# Patient Record
Sex: FEMALE | Race: WHITE | NOT HISPANIC OR LATINO | Employment: FULL TIME | URBAN - METROPOLITAN AREA
[De-identification: names, ages, dates, MRNs, and addresses within clinical notes are randomized per-mention and may not be internally consistent; named-entity substitution may affect disease eponyms.]

---

## 2019-02-14 ENCOUNTER — OFFICE VISIT (OUTPATIENT)
Dept: URGENT CARE | Facility: CLINIC | Age: 24
End: 2019-02-14
Payer: COMMERCIAL

## 2019-02-14 VITALS
BODY MASS INDEX: 23.54 KG/M2 | HEIGHT: 67 IN | DIASTOLIC BLOOD PRESSURE: 86 MMHG | OXYGEN SATURATION: 98 % | RESPIRATION RATE: 16 BRPM | WEIGHT: 150 LBS | SYSTOLIC BLOOD PRESSURE: 121 MMHG | HEART RATE: 116 BPM | TEMPERATURE: 99 F

## 2019-02-14 DIAGNOSIS — J02.9 SORE THROAT: ICD-10-CM

## 2019-02-14 DIAGNOSIS — J06.9 VIRAL URI WITH COUGH: Primary | ICD-10-CM

## 2019-02-14 LAB
CTP QC/QA: YES
S PYO RRNA THROAT QL PROBE: NEGATIVE

## 2019-02-14 PROCEDURE — 87880 POCT RAPID STREP A: ICD-10-PCS | Mod: QW,S$GLB,, | Performed by: NURSE PRACTITIONER

## 2019-02-14 PROCEDURE — 3008F BODY MASS INDEX DOCD: CPT | Mod: CPTII,S$GLB,, | Performed by: NURSE PRACTITIONER

## 2019-02-14 PROCEDURE — 87880 STREP A ASSAY W/OPTIC: CPT | Mod: QW,S$GLB,, | Performed by: NURSE PRACTITIONER

## 2019-02-14 PROCEDURE — 99203 PR OFFICE/OUTPT VISIT, NEW, LEVL III, 30-44 MIN: ICD-10-PCS | Mod: S$GLB,,, | Performed by: NURSE PRACTITIONER

## 2019-02-14 PROCEDURE — 99203 OFFICE O/P NEW LOW 30 MIN: CPT | Mod: S$GLB,,, | Performed by: NURSE PRACTITIONER

## 2019-02-14 PROCEDURE — 3008F PR BODY MASS INDEX (BMI) DOCUMENTED: ICD-10-PCS | Mod: CPTII,S$GLB,, | Performed by: NURSE PRACTITIONER

## 2019-02-14 RX ORDER — FLUTICASONE PROPIONATE 50 MCG
1 SPRAY, SUSPENSION (ML) NASAL 2 TIMES DAILY
Qty: 1 BOTTLE | Refills: 0 | Status: SHIPPED | OUTPATIENT
Start: 2019-02-14 | End: 2019-02-28

## 2019-02-14 RX ORDER — BUSPIRONE HYDROCHLORIDE 15 MG/1
15 TABLET ORAL 3 TIMES DAILY PRN
COMMUNITY

## 2019-02-14 RX ORDER — PROMETHAZINE HYDROCHLORIDE AND DEXTROMETHORPHAN HYDROBROMIDE 6.25; 15 MG/5ML; MG/5ML
5 SYRUP ORAL NIGHTLY PRN
Qty: 118 ML | Refills: 0 | Status: SHIPPED | OUTPATIENT
Start: 2019-02-14 | End: 2019-02-24

## 2019-02-14 RX ORDER — BUPROPION HYDROCHLORIDE 300 MG/1
300 TABLET ORAL DAILY
COMMUNITY

## 2019-02-14 NOTE — PATIENT INSTRUCTIONS
You may continue taking Tylenol (acetaminophen) or ibuprofen for pain and fever.      Viral Upper Respiratory Illness (Adult)  You have a viral upper respiratory illness (URI), which is another term for the common cold. This illness is contagious during the first few days. It is spread through the air by coughing and sneezing. It may also be spread by direct contact (touching the sick person and then touching your own eyes, nose, or mouth). Frequent handwashing will decrease risk of spread. Most viral illnesses go away within 7 to 10 days with rest and simple home remedies. Sometimes the illness may last for several weeks. Antibiotics will not kill a virus, and they are generally not prescribed for this condition.    Home care  · If symptoms are severe, rest at home for the first 2 to 3 days. When you resume activity, don't let yourself get too tired.  · Avoid being exposed to cigarette smoke (yours or others).  · You may use acetaminophen or ibuprofen to control pain and fever, unless another medicine was prescribed. (Note: If you have chronic liver or kidney disease, have ever had a stomach ulcer or gastrointestinal bleeding, or are taking blood-thinning medicines, talk with your healthcare provider before using these medicines.) Aspirin should never be given to anyone under 18 years of age who is ill with a viral infection or fever. It may cause severe liver or brain damage.  · Your appetite may be poor, so a light diet is fine. Avoid dehydration by drinking 6 to 8 glasses of fluids per day (water, soft drinks, juices, tea, or soup). Extra fluids will help loosen secretions in the nose and lungs.  · Over-the-counter cold medicines will not shorten the length of time youre sick, but they may be helpful for the following symptoms: cough, sore throat, and nasal and sinus congestion. (Note: Do not use decongestants if you have high blood pressure.)  Follow-up care  Follow up with your healthcare provider, or as  advised.  When to seek medical advice  Call your healthcare provider right away if any of these occur:  · Cough with lots of colored sputum (mucus)  · Severe headache; face, neck, or ear pain  · Difficulty swallowing due to throat pain  · Fever of 100.4°F (38°C)  Call 911, or get immediate medical care  Call emergency services right away if any of these occur:  · Chest pain, shortness of breath, wheezing, or difficulty breathing  · Coughing up blood  · Inability to swallow due to throat pain  Date Last Reviewed: 9/13/2015  © 4367-5198 [employer]. [address] [place] [address]. All rights reserved. This information is not intended as a substitute for professional medical care. Always follow your healthcare professional's instructions.

## 2019-02-14 NOTE — PROGRESS NOTES
"Subjective:       Patient ID: Carmella Gallagher is a 23 y.o. female.    Vitals:  height is 5' 7" (1.702 m) and weight is 68 kg (150 lb). Her temperature is 98.6 °F (37 °C). Her blood pressure is 121/86 and her pulse is 116 (abnormal). Her respiration is 16 and oxygen saturation is 98%.     Chief Complaint: Sore Throat    Pt lives local. Pt is in for sore throat, runny nose, post nasal drip, cough, congestion, and headache-started on Monday (3 days). Feels sore throat has worsened since onset. Worse at night. Some cough that is worse at night. Pt stated it is painful to swallow. Pt denies fever. Pt took nyquil-with no relief.     No fever. No known sick contacts. No N/V/D.      Sore Throat    This is a new problem. The current episode started in the past 7 days. The problem has been gradually worsening. Neither side of throat is experiencing more pain than the other. The pain is at a severity of 5/10. Associated symptoms include congestion, coughing, headaches and trouble swallowing. Pertinent negatives include no abdominal pain, diarrhea, drooling, ear discharge, ear pain, hoarse voice, plugged ear sensation, neck pain, shortness of breath, stridor, swollen glands or vomiting. Treatments tried: nyquil. The treatment provided no relief.       Constitution: Negative for chills, sweating, fatigue and fever.   HENT: Positive for congestion, postnasal drip, sore throat and trouble swallowing. Negative for ear pain, ear discharge, drooling, sinus pain, sinus pressure and voice change.    Neck: Negative for neck pain and painful lymph nodes.   Eyes: Negative for eye redness.   Respiratory: Positive for cough. Negative for chest tightness, sputum production, bloody sputum, COPD, shortness of breath, stridor, wheezing and asthma.    Gastrointestinal: Negative for abdominal pain, nausea, vomiting and diarrhea.   Musculoskeletal: Negative for muscle ache.   Skin: Negative for rash.   Allergic/Immunologic: Negative for seasonal " allergies and asthma.   Neurological: Positive for headaches.   Hematologic/Lymphatic: Negative for swollen lymph nodes.       Objective:      Physical Exam   Constitutional: She is oriented to person, place, and time. She appears well-developed and well-nourished. She is cooperative.  Non-toxic appearance. She does not appear ill. No distress.   HENT:   Head: Normocephalic and atraumatic.   Right Ear: Hearing, tympanic membrane, external ear and ear canal normal.   Left Ear: Hearing, tympanic membrane, external ear and ear canal normal.   Nose: Rhinorrhea present. No mucosal edema or nasal deformity. No epistaxis. Right sinus exhibits no maxillary sinus tenderness and no frontal sinus tenderness. Left sinus exhibits no maxillary sinus tenderness and no frontal sinus tenderness.   Mouth/Throat: Uvula is midline and mucous membranes are normal. No trismus in the jaw. Normal dentition. No uvula swelling. Posterior oropharyngeal erythema present.       Eyes: Conjunctivae and lids are normal. No scleral icterus.   Sclera clear bilat   Neck: Trachea normal, full passive range of motion without pain and phonation normal. Neck supple.   Cardiovascular: Normal rate, regular rhythm, normal heart sounds, intact distal pulses and normal pulses.   Pulmonary/Chest: Effort normal and breath sounds normal. No stridor. No respiratory distress. She has no decreased breath sounds. She has no wheezes.   Abdominal: Soft. Normal appearance and bowel sounds are normal. She exhibits no distension. There is no tenderness.   Musculoskeletal: Normal range of motion. She exhibits no edema or deformity.   Neurological: She is alert and oriented to person, place, and time. She exhibits normal muscle tone. Coordination normal.   Skin: Skin is warm, dry and intact. She is not diaphoretic. No pallor.   Psychiatric: She has a normal mood and affect. Her speech is normal and behavior is normal. Judgment and thought content normal. Cognition and  memory are normal.   Nursing note and vitals reviewed.      POCT rapid strep A   Order: [id number]   Status:  Final result   Visible to patient:  No (Not Released) Next appt:  None Dx:  Sore throat    Ref Range & Units 10:43   Rapid Strep A Screen Negative Negative     Acceptable  Yes    [place]  [facility] [place]essment:       1. Viral URI with cough    2. Sore throat        Plan:         Viral URI with cough  -     fluticasone (FLONASE) 50 mcg/actuation nasal spray; 1 spray (50 mcg total) by Each Nare route 2 (two) times daily. for 14 days  Dispense: 1 Bottle; Refill: 0  -     promethazine-dextromethorphan (PROMETHAZINE-DM) 6.25-15 mg/5 mL Syrp; Take 5 mLs by mouth nightly as needed.  Dispense: 118 mL; Refill: 0    Sore throat  -     POCT rapid strep A  -     (Magic mouthwash) 1:1:1 Benadryl 12.5mg/5ml liq, aluminum & magnesium hydroxide-simehticone (Maalox), lidocaine viscous 2%; Swish and spit 5 mLs every 4 (four) hours as needed (gargle and spit). for mouth sores  Dispense: 360 mL; Refill: 0      Patient Instructions   You may continue taking Tylenol (acetaminophen) or ibuprofen for pain and fever.      Viral Upper Respiratory Illness (Adult)  You have a viral upper respiratory illness (URI), which is another term for the common cold. This illness is contagious during the first few days. It is spread through the air by coughing and sneezing. It may also be spread by direct contact (touching the sick person and then touching your own eyes, nose, or mouth). Frequent handwashing will decrease risk of spread. Most viral illnesses go away within 7 to 10 days with rest and simple home remedies. Sometimes the illness may last for several weeks. Antibiotics will not kill a virus, and they are generally not prescribed for this condition.    Home care  · If symptoms are severe, rest at home for the first 2 to 3 days. When you resume activity, don't let yourself get too tired.  · Avoid being  exposed to cigarette smoke (yours or others).  · You may use acetaminophen or ibuprofen to control pain and fever, unless another medicine was prescribed. (Note: If you have chronic liver or kidney disease, have ever had a stomach ulcer or gastrointestinal bleeding, or are taking blood-thinning medicines, talk with your healthcare provider before using these medicines.) Aspirin should never be given to anyone under 18 years of age who is ill with a viral infection or fever. It may cause severe liver or brain damage.  · Your appetite may be poor, so a light diet is fine. Avoid dehydration by drinking 6 to 8 glasses of fluids per day (water, soft drinks, juices, tea, or soup). Extra fluids will help loosen secretions in the nose and lungs.  · Over-the-counter cold medicines will not shorten the length of time youre sick, but they may be helpful for the following symptoms: cough, sore throat, and nasal and sinus congestion. (Note: Do not use decongestants if you have high blood pressure.)  Follow-up care  Follow up with your healthcare provider, or as advised.  When to seek medical advice  Call your healthcare provider right away if any of these occur:  · Cough with lots of colored sputum (mucus)  · Severe headache; face, neck, or ear pain  · Difficulty swallowing due to throat pain  · Fever of 100.4°F (38°C)  Call 911, or get immediate medical care  Call emergency services right away if any of these occur:  · Chest pain, shortness of breath, wheezing, or difficulty breathing  · Coughing up blood  · Inability to swallow due to throat pain  Date Last Reviewed: 9/13/2015 © 2000-2017 [employer]. [address] [place] [address], [place] [address]. All rights reserved. This information is not intended as a substitute for professional medical care. Always follow your healthcare professional's instructions.

## 2019-02-17 ENCOUNTER — TELEPHONE (OUTPATIENT)
Dept: URGENT CARE | Facility: CLINIC | Age: 24
End: 2019-02-17

## 2021-03-13 ENCOUNTER — IMMUNIZATION (OUTPATIENT)
Dept: PRIMARY CARE CLINIC | Facility: CLINIC | Age: 26
End: 2021-03-13
Payer: COMMERCIAL

## 2021-03-13 DIAGNOSIS — Z23 NEED FOR VACCINATION: Primary | ICD-10-CM

## 2021-03-13 PROCEDURE — 0001A PR IMMUNIZ ADMIN, SARS-COV-2 COVID-19 VACC, 30MCG/0.3ML, 1ST DOSE: CPT | Mod: CV19,S$GLB,, | Performed by: INTERNAL MEDICINE

## 2021-03-13 PROCEDURE — 91300 PR SARS-COV- 2 COVID-19 VACCINE, NO PRSV, 30MCG/0.3ML, IM: CPT | Mod: S$GLB,,, | Performed by: INTERNAL MEDICINE

## 2021-03-13 PROCEDURE — 91300 PR SARS-COV- 2 COVID-19 VACCINE, NO PRSV, 30MCG/0.3ML, IM: ICD-10-PCS | Mod: S$GLB,,, | Performed by: INTERNAL MEDICINE

## 2021-03-13 PROCEDURE — 0001A PR IMMUNIZ ADMIN, SARS-COV-2 COVID-19 VACC, 30MCG/0.3ML, 1ST DOSE: ICD-10-PCS | Mod: CV19,S$GLB,, | Performed by: INTERNAL MEDICINE

## 2021-03-13 RX ADMIN — Medication 0.3 ML: at 11:03

## 2021-04-03 ENCOUNTER — IMMUNIZATION (OUTPATIENT)
Dept: PRIMARY CARE CLINIC | Facility: CLINIC | Age: 26
End: 2021-04-03
Payer: COMMERCIAL

## 2021-04-03 DIAGNOSIS — Z23 NEED FOR VACCINATION: Primary | ICD-10-CM

## 2021-04-03 PROCEDURE — 91300 PR SARS-COV- 2 COVID-19 VACCINE, NO PRSV, 30MCG/0.3ML, IM: CPT | Mod: S$GLB,,, | Performed by: INTERNAL MEDICINE

## 2021-04-03 PROCEDURE — 0002A PR IMMUNIZ ADMIN, SARS-COV-2 COVID-19 VACC, 30MCG/0.3ML, 2ND DOSE: ICD-10-PCS | Mod: CV19,S$GLB,, | Performed by: INTERNAL MEDICINE

## 2021-04-03 PROCEDURE — 91300 PR SARS-COV- 2 COVID-19 VACCINE, NO PRSV, 30MCG/0.3ML, IM: ICD-10-PCS | Mod: S$GLB,,, | Performed by: INTERNAL MEDICINE

## 2021-04-03 PROCEDURE — 0002A PR IMMUNIZ ADMIN, SARS-COV-2 COVID-19 VACC, 30MCG/0.3ML, 2ND DOSE: CPT | Mod: CV19,S$GLB,, | Performed by: INTERNAL MEDICINE

## 2021-04-03 RX ADMIN — Medication 0.3 ML: at 11:04

## 2022-11-11 ENCOUNTER — HOSPITAL ENCOUNTER (EMERGENCY)
Facility: OTHER | Age: 27
Discharge: HOME | End: 2022-11-11
Attending: EMERGENCY MEDICINE
Payer: MEDICAID

## 2022-11-11 VITALS
WEIGHT: 160 LBS | OXYGEN SATURATION: 99 % | SYSTOLIC BLOOD PRESSURE: 121 MMHG | RESPIRATION RATE: 18 BRPM | DIASTOLIC BLOOD PRESSURE: 82 MMHG | HEIGHT: 67 IN | HEART RATE: 92 BPM | BODY MASS INDEX: 25.11 KG/M2 | TEMPERATURE: 98 F

## 2022-11-11 DIAGNOSIS — R56.9 SEIZURE: ICD-10-CM

## 2022-11-11 LAB
ALBUMIN SERPL BCP-MCNC: 3.9 G/DL (ref 3.5–5.2)
ALP SERPL-CCNC: 135 U/L (ref 55–135)
ALT SERPL W/O P-5'-P-CCNC: 28 U/L (ref 10–44)
AMPHET+METHAMPHET UR QL: NEGATIVE
ANION GAP SERPL CALC-SCNC: 8 MMOL/L (ref 8–16)
AST SERPL-CCNC: 22 U/L (ref 10–40)
B-HCG UR QL: NEGATIVE
BACTERIA #/AREA URNS HPF: NORMAL /HPF
BARBITURATES UR QL SCN>200 NG/ML: NEGATIVE
BASOPHILS # BLD AUTO: 0.06 K/UL (ref 0–0.2)
BASOPHILS NFR BLD: 0.6 % (ref 0–1.9)
BENZODIAZ UR QL SCN>200 NG/ML: NEGATIVE
BILIRUB SERPL-MCNC: 0.4 MG/DL (ref 0.1–1)
BILIRUB UR QL STRIP: NEGATIVE
BUN SERPL-MCNC: 13 MG/DL (ref 6–20)
BZE UR QL SCN: NEGATIVE
CALCIUM SERPL-MCNC: 8.8 MG/DL (ref 8.7–10.5)
CANNABINOIDS UR QL SCN: NEGATIVE
CHLORIDE SERPL-SCNC: 109 MMOL/L (ref 95–110)
CLARITY UR: CLEAR
CO2 SERPL-SCNC: 21 MMOL/L (ref 23–29)
COLOR UR: YELLOW
CREAT SERPL-MCNC: 1 MG/DL (ref 0.5–1.4)
CREAT UR-MCNC: 127.3 MG/DL (ref 15–325)
CTP QC/QA: YES
DIFFERENTIAL METHOD: ABNORMAL
EOSINOPHIL # BLD AUTO: 0.3 K/UL (ref 0–0.5)
EOSINOPHIL NFR BLD: 3 % (ref 0–8)
ERYTHROCYTE [DISTWIDTH] IN BLOOD BY AUTOMATED COUNT: 11.3 % (ref 11.5–14.5)
EST. GFR  (NO RACE VARIABLE): >60 ML/MIN/1.73 M^2
GLUCOSE SERPL-MCNC: 90 MG/DL (ref 70–110)
GLUCOSE UR QL STRIP: NEGATIVE
HCT VFR BLD AUTO: 39.3 % (ref 37–48.5)
HCV AB SERPL QL IA: NEGATIVE
HGB BLD-MCNC: 13.8 G/DL (ref 12–16)
HGB UR QL STRIP: NEGATIVE
HIV 1+2 AB+HIV1 P24 AG SERPL QL IA: NEGATIVE
IMM GRANULOCYTES # BLD AUTO: 0.05 K/UL (ref 0–0.04)
IMM GRANULOCYTES NFR BLD AUTO: 0.5 % (ref 0–0.5)
KETONES UR QL STRIP: ABNORMAL
LEUKOCYTE ESTERASE UR QL STRIP: ABNORMAL
LYMPHOCYTES # BLD AUTO: 1.7 K/UL (ref 1–4.8)
LYMPHOCYTES NFR BLD: 16.6 % (ref 18–48)
MAGNESIUM SERPL-MCNC: 2.2 MG/DL (ref 1.6–2.6)
MCH RBC QN AUTO: 33.2 PG (ref 27–31)
MCHC RBC AUTO-ENTMCNC: 35.1 G/DL (ref 32–36)
MCV RBC AUTO: 95 FL (ref 82–98)
METHADONE UR QL SCN>300 NG/ML: NEGATIVE
MICROSCOPIC COMMENT: NORMAL
MONOCYTES # BLD AUTO: 0.6 K/UL (ref 0.3–1)
MONOCYTES NFR BLD: 5.9 % (ref 4–15)
NEUTROPHILS # BLD AUTO: 7.4 K/UL (ref 1.8–7.7)
NEUTROPHILS NFR BLD: 73.4 % (ref 38–73)
NITRITE UR QL STRIP: NEGATIVE
NRBC BLD-RTO: 0 /100 WBC
OPIATES UR QL SCN: NEGATIVE
PCP UR QL SCN>25 NG/ML: NEGATIVE
PH UR STRIP: 6 [PH] (ref 5–8)
PLATELET # BLD AUTO: 282 K/UL (ref 150–450)
PMV BLD AUTO: 10.4 FL (ref 9.2–12.9)
POTASSIUM SERPL-SCNC: 3.9 MMOL/L (ref 3.5–5.1)
PROT SERPL-MCNC: 6.9 G/DL (ref 6–8.4)
PROT UR QL STRIP: ABNORMAL
RBC # BLD AUTO: 4.16 M/UL (ref 4–5.4)
SODIUM SERPL-SCNC: 138 MMOL/L (ref 136–145)
SP GR UR STRIP: >=1.03 (ref 1–1.03)
SQUAMOUS #/AREA URNS HPF: 4 /HPF
TOXICOLOGY INFORMATION: NORMAL
URN SPEC COLLECT METH UR: ABNORMAL
UROBILINOGEN UR STRIP-ACNC: NEGATIVE EU/DL
WBC # BLD AUTO: 10.03 K/UL (ref 3.9–12.7)
WBC #/AREA URNS HPF: 2 /HPF (ref 0–5)

## 2022-11-11 PROCEDURE — 25000003 PHARM REV CODE 250: Performed by: EMERGENCY MEDICINE

## 2022-11-11 PROCEDURE — 93010 EKG 12-LEAD: ICD-10-PCS | Mod: ,,, | Performed by: INTERNAL MEDICINE

## 2022-11-11 PROCEDURE — 85025 COMPLETE CBC W/AUTO DIFF WBC: CPT | Performed by: EMERGENCY MEDICINE

## 2022-11-11 PROCEDURE — 80053 COMPREHEN METABOLIC PANEL: CPT | Performed by: EMERGENCY MEDICINE

## 2022-11-11 PROCEDURE — 81025 URINE PREGNANCY TEST: CPT | Performed by: EMERGENCY MEDICINE

## 2022-11-11 PROCEDURE — 87389 HIV-1 AG W/HIV-1&-2 AB AG IA: CPT | Performed by: EMERGENCY MEDICINE

## 2022-11-11 PROCEDURE — 81000 URINALYSIS NONAUTO W/SCOPE: CPT | Mod: 59 | Performed by: EMERGENCY MEDICINE

## 2022-11-11 PROCEDURE — 93010 ELECTROCARDIOGRAM REPORT: CPT | Mod: ,,, | Performed by: INTERNAL MEDICINE

## 2022-11-11 PROCEDURE — 80307 DRUG TEST PRSMV CHEM ANLYZR: CPT | Performed by: EMERGENCY MEDICINE

## 2022-11-11 PROCEDURE — 83735 ASSAY OF MAGNESIUM: CPT | Performed by: EMERGENCY MEDICINE

## 2022-11-11 PROCEDURE — 99285 EMERGENCY DEPT VISIT HI MDM: CPT | Mod: 25

## 2022-11-11 PROCEDURE — 93005 ELECTROCARDIOGRAM TRACING: CPT

## 2022-11-11 PROCEDURE — 86803 HEPATITIS C AB TEST: CPT | Performed by: EMERGENCY MEDICINE

## 2022-11-11 PROCEDURE — 96374 THER/PROPH/DIAG INJ IV PUSH: CPT

## 2022-11-11 PROCEDURE — 63600175 PHARM REV CODE 636 W HCPCS: Performed by: EMERGENCY MEDICINE

## 2022-11-11 PROCEDURE — 96361 HYDRATE IV INFUSION ADD-ON: CPT

## 2022-11-11 RX ORDER — LEVETIRACETAM 500 MG/1
500 TABLET ORAL 2 TIMES DAILY
Qty: 62 TABLET | Refills: 0 | Status: SHIPPED | OUTPATIENT
Start: 2022-11-11 | End: 2022-11-16

## 2022-11-11 RX ORDER — SODIUM CHLORIDE 9 MG/ML
1000 INJECTION, SOLUTION INTRAVENOUS
Status: COMPLETED | OUTPATIENT
Start: 2022-11-11 | End: 2022-11-11

## 2022-11-11 RX ORDER — LEVETIRACETAM 500 MG/5ML
1000 INJECTION, SOLUTION, CONCENTRATE INTRAVENOUS
Status: COMPLETED | OUTPATIENT
Start: 2022-11-11 | End: 2022-11-11

## 2022-11-11 RX ADMIN — SODIUM CHLORIDE 1000 ML: 0.9 INJECTION, SOLUTION INTRAVENOUS at 02:11

## 2022-11-11 RX ADMIN — LEVETIRACETAM 1000 MG: 500 INJECTION, SOLUTION INTRAVENOUS at 05:11

## 2022-11-11 NOTE — ED PROVIDER NOTES
"Encounter Date: 11/11/2022       History     Chief Complaint   Patient presents with    Seizures     Pt presents to the ER with concerns she may have had a seizure. Pt states her co-workers reporting she was unresponsive, staring into space, turned blue and was foaming at the mouth. Episode lasted "not that long", but pt wa "out of it" after episode. Pt reports hx of seizures as a child; has not had a seizure since she was 2yo; not currently on seizure medications.        Pt is a 27yoF h/o depression, anxiety presenting for evaluation after witnessed seizure at work. Pt states she has been in her usual state of health recently, no fever chills, headache, head injuries, neck pain, cough, SOB, chest pain, exercise intolerance, vomiting or diarrhea, leg pain or swelling. She has been under a lot of stress at work. Today she was at work sitting in a chair at her desk when (per coworkers report) she stiffened, looked to the side, appeared to be staring but didn't respond when they talked to her, then she developed generalized shaking, "foaming at her mouth" and noisy irregular breathing. Event lasted minutes and then resolved. Pt did not fall from chair, did not hit her head. She was confused afterwards but is now back to baseline. Pt states she does not recall any of this.     She reports having one seizure as a child at age 3, unsure if it was febrile seizure or not, did not take seizure medications. Has not had any seizures in adulthood to her knowledge. She does take Wellbutrin 450mg/day, has been stable on this for approx 7 years, no recent dose adjustments or extra doses. She reports drinking 1-2 glass of wine/day for the past several years, but denies any withdrawal sx. She in fact abstained completely or several days last week d/t taking abx for BV and did not have any withdrawal symptoms at that time. Denies shakes, sweats, nausea/vomiting, hallucinations, tremors, confusion, etc.     Review of patient's " allergies indicates:  No Known Allergies  Past Medical History:   Diagnosis Date    Anxiety     Bipolar disorder     PTSD (post-traumatic stress disorder)      No past surgical history on file.  Family History   Problem Relation Age of Onset    No Known Problems Mother     No Known Problems Father      Social History     Tobacco Use    Smoking status: Never    Smokeless tobacco: Never   Substance Use Topics    Alcohol use: Yes     Comment: occ    Drug use: No     Review of Systems   Constitutional:  Negative for chills and fever.   Respiratory:  Negative for cough and shortness of breath.    Cardiovascular:  Negative for chest pain.   Gastrointestinal:  Negative for abdominal pain, diarrhea, nausea and vomiting.   Musculoskeletal:  Negative for neck pain.   Neurological:  Positive for seizures. Negative for dizziness, tremors, facial asymmetry, speech difficulty, weakness, light-headedness, numbness and headaches.   Psychiatric/Behavioral:  Negative for confusion and hallucinations.    All other systems reviewed and are negative.    Physical Exam     Initial Vitals [11/11/22 1308]   BP Pulse Resp Temp SpO2   123/76 (!) 133 18 98.9 °F (37.2 °C) 97 %      MAP       --         Physical Exam    Constitutional: She appears well-developed and well-nourished. She is not diaphoretic. No distress.   HENT:   Head: Normocephalic and atraumatic.   Mouth/Throat: No lacerations.   Eyes: EOM are normal. Pupils are equal, round, and reactive to light.   Neck:   Normal range of motion.  Cardiovascular:            Tachycardic, regular   Pulmonary/Chest: Breath sounds normal.   Abdominal: Abdomen is soft.   Musculoskeletal:         General: Normal range of motion.      Cervical back: Normal range of motion.     Neurological: She is alert and oriented to person, place, and time. She has normal strength. No cranial nerve deficit or sensory deficit. GCS score is 15. GCS eye subscore is 4. GCS verbal subscore is 5. GCS motor subscore is  6.   Skin: Skin is warm and dry.   Psychiatric: She has a normal mood and affect.       ED Course   Procedures  Labs Reviewed   CBC W/ AUTO DIFFERENTIAL - Abnormal; Notable for the following components:       Result Value    MCH 33.2 (*)     RDW 11.3 (*)     Immature Grans (Abs) 0.05 (*)     Gran % 73.4 (*)     Lymph % 16.6 (*)     All other components within normal limits   COMPREHENSIVE METABOLIC PANEL - Abnormal; Notable for the following components:    CO2 21 (*)     All other components within normal limits   URINALYSIS, REFLEX TO URINE CULTURE - Abnormal; Notable for the following components:    Specific Gravity, UA >=1.030 (*)     Protein, UA Trace (*)     Ketones, UA Trace (*)     Leukocytes, UA Trace (*)     All other components within normal limits    Narrative:     Specimen Source->Urine   MAGNESIUM   DRUG SCREEN PANEL, URINE EMERGENCY    Narrative:     Specimen Source->Urine   HIV 1 / 2 ANTIBODY    Narrative:     Release to patient->Immediate   HEPATITIS C ANTIBODY    Narrative:     Release to patient->Immediate   URINALYSIS MICROSCOPIC    Narrative:     Specimen Source->Urine   POCT URINE PREGNANCY     EKG Readings: (Independently Interpreted)   NSR rate 109  Normal axis and intervals  No signs of WPW, Brugada, or long QT syndrome  No acute ST changes  Normal EKG     Imaging Results              CT Head Without Contrast (Final result)  Result time 11/11/22 16:04:38      Final result by Diogenes Adams MD (11/11/22 16:04:38)                   Impression:      No intracranial mass, hemorrhage, or evidence of acute infarction.    Prominent CSF space in the posterior fossa, which could represent an arachnoid cyst or ariadna cisterna magna.      Electronically signed by: Diogenes Adams  Date:    11/11/2022  Time:    16:04               Narrative:    EXAMINATION:  CT HEAD WITHOUT CONTRAST    CLINICAL HISTORY:  Seizure, new-onset, no history of trauma;    TECHNIQUE:  Low dose axial images were obtained through the  head.  Coronal and sagittal reformations were also performed. Contrast was not administered.    COMPARISON:  None.    FINDINGS:  BRAIN: The brain parenchyma demonstrates no significant abnormality. No intracranial mass or hemorrhage. No evidence of acute infarction.    CSF SPACES: Prominent CSF space in the posterior fossa measuring 3.8 x 3.5 x 1.5 cm (2:12 and 602:60).  Ventricles, sulci, and cisterns are otherwise normal in size and configuration.    VESSELS: The major intracranial vessels are unremarkable.    BONES: No fracture or focal osseous lesion. Partially imaged mucous retention cyst in the left maxillary sinus.  Paranasal sinuses and mastoid air cells are otherwise well aerated.    SOFT TISSUES: Extracranial soft tissues are unremarkable.                                       Medications   0.9%  NaCl infusion (0 mLs Intravenous Stopped 11/11/22 1500)   levETIRAcetam injection 1,000 mg (1,000 mg Intravenous Given 11/11/22 1722)     Medical Decision Making:   ED Management:  27F presents after witnessed generalized tonic-clonic seizure at work. +Postictal but now back to baseline. No recent injuries or trauma.    H+P not c/w EtOH withdrawal sz. Likely new onset seizure in an adult. Low suspicion for syncope.     Plan:  -IVNS bolus  -CBC, CMP, Mg, UA/Upreg, EKG  -CT head noncon  -Discussed that Wellbutrin is likely not the right choice for patient at this point as it lowers seizure threshold. I would prefer to discuss this w her psychiatrist before abruptly discontinuing it. Left  for psychiatrist Dr. Kiera Olea [phone], awaiting callback    5:51pm  Labs and CT head unremarkable. Still no callback from Dr. Olea. I did speak with neurology on call Dr. Cai. He recommends starting patient on antiepileptics for now. Recommends she follow-up with psychiatrist asap to discuss stopping Wellbutrin, but he does not feel it is critical for patient to immediately discontinue this tonight. Will load w  Keppra and DC home with same. Long discussion with patient and her partner at bedside. Also discussed alcohol cessation. Pt understands and agrees w plan.                         Clinical Impression:   Final diagnoses:  [R56.9] Seizure        ED Disposition Condition    Discharge Stable          ED Prescriptions       Medication Sig Dispense Start Date End Date Auth. Provider    levETIRAcetam (KEPPRA) 500 MG Tab Take 1 tablet (500 mg total) by mouth 2 (two) times daily. 62 tablet 11/11/2022 12/12/2022 Jane Adams MD          Follow-up Information    None          Jane Adams MD  11/11/22 3595

## 2022-11-11 NOTE — Clinical Note
"Carmella"MONTY Gallagher was seen and treated in our emergency department on 11/11/2022.  She may return to work on 11/14/2022.       If you have any questions or concerns, please don't hesitate to call.      Jane Adams MD"

## 2022-11-11 NOTE — DISCHARGE INSTRUCTIONS
You have been seen in the ER today after having a seizure. Your CT scan and bloodwork did not show any significant abnormalities. I spoke with the neurologist on call, and we recommend:    Start taking the Keppra twice a day as prescribed. You received a loading dose in the ER - your next dose will be tomorrow morning.   Call to schedule the next available appointment at neurology clinic. I have placed a referral request, but you can also call them directly at [phone].  Do not drink alcohol as this lowers the seizure threshold  Speak with your psychiatrist as soon as possible about your Wellbutrin, and whether there are safer alternatives for you  Do not drive, do not take baths or swim, do not climb ladders or operate heavy machinery until cleared by neurology    Return to the ER for any new or worsening symptoms

## 2022-11-11 NOTE — ED NOTES
Pt to er 4 via w.c with c.o seizure while at work approx 1200.  Co worker witnessed pt starring off in space and jerking motion. Pt states hx of seizure at 3 years of age.  Pt has not ever been on siezure meds.  Pt states she felt fine this AM. States she has anxiety issue and felt little anxious at work. Pt states she is having trouble remembering things at this time. Pt states she feels like her brain is foggy and dizzy.  AAO x 3 nadn skin w.d  pt placed in gown and on cardiac monitors.  Family at bedside

## 2022-11-16 ENCOUNTER — OFFICE VISIT (OUTPATIENT)
Dept: NEUROLOGY | Facility: CLINIC | Age: 27
End: 2022-11-16
Payer: MEDICAID

## 2022-11-16 ENCOUNTER — LAB VISIT (OUTPATIENT)
Dept: LAB | Facility: HOSPITAL | Age: 27
End: 2022-11-16
Attending: PSYCHIATRY & NEUROLOGY
Payer: MEDICAID

## 2022-11-16 VITALS
SYSTOLIC BLOOD PRESSURE: 117 MMHG | DIASTOLIC BLOOD PRESSURE: 83 MMHG | HEART RATE: 101 BPM | BODY MASS INDEX: 25.58 KG/M2 | HEIGHT: 67 IN | WEIGHT: 163 LBS

## 2022-11-16 DIAGNOSIS — F31.9 BIPOLAR AFFECTIVE DISORDER, REMISSION STATUS UNSPECIFIED: ICD-10-CM

## 2022-11-16 DIAGNOSIS — R56.9 CONVULSIONS, UNSPECIFIED CONVULSION TYPE: Primary | ICD-10-CM

## 2022-11-16 DIAGNOSIS — F41.9 ANXIETY: ICD-10-CM

## 2022-11-16 DIAGNOSIS — F43.10 PTSD (POST-TRAUMATIC STRESS DISORDER): ICD-10-CM

## 2022-11-16 DIAGNOSIS — R56.9 CONVULSIONS, UNSPECIFIED CONVULSION TYPE: ICD-10-CM

## 2022-11-16 PROCEDURE — 3079F PR MOST RECENT DIASTOLIC BLOOD PRESSURE 80-89 MM HG: ICD-10-PCS | Mod: CPTII,,, | Performed by: PSYCHIATRY & NEUROLOGY

## 2022-11-16 PROCEDURE — 1159F PR MEDICATION LIST DOCUMENTED IN MEDICAL RECORD: ICD-10-PCS | Mod: CPTII,,, | Performed by: PSYCHIATRY & NEUROLOGY

## 2022-11-16 PROCEDURE — 3079F DIAST BP 80-89 MM HG: CPT | Mod: CPTII,,, | Performed by: PSYCHIATRY & NEUROLOGY

## 2022-11-16 PROCEDURE — 99205 PR OFFICE/OUTPT VISIT, NEW, LEVL V, 60-74 MIN: ICD-10-PCS | Mod: S$PBB,,, | Performed by: PSYCHIATRY & NEUROLOGY

## 2022-11-16 PROCEDURE — 1160F RVW MEDS BY RX/DR IN RCRD: CPT | Mod: CPTII,,, | Performed by: PSYCHIATRY & NEUROLOGY

## 2022-11-16 PROCEDURE — 80175 DRUG SCREEN QUAN LAMOTRIGINE: CPT | Performed by: PSYCHIATRY & NEUROLOGY

## 2022-11-16 PROCEDURE — 1160F PR REVIEW ALL MEDS BY PRESCRIBER/CLIN PHARMACIST DOCUMENTED: ICD-10-PCS | Mod: CPTII,,, | Performed by: PSYCHIATRY & NEUROLOGY

## 2022-11-16 PROCEDURE — 99214 OFFICE O/P EST MOD 30 MIN: CPT | Mod: PBBFAC | Performed by: PSYCHIATRY & NEUROLOGY

## 2022-11-16 PROCEDURE — 1159F MED LIST DOCD IN RCRD: CPT | Mod: CPTII,,, | Performed by: PSYCHIATRY & NEUROLOGY

## 2022-11-16 PROCEDURE — 99205 OFFICE O/P NEW HI 60 MIN: CPT | Mod: S$PBB,,, | Performed by: PSYCHIATRY & NEUROLOGY

## 2022-11-16 PROCEDURE — 3008F PR BODY MASS INDEX (BMI) DOCUMENTED: ICD-10-PCS | Mod: CPTII,,, | Performed by: PSYCHIATRY & NEUROLOGY

## 2022-11-16 PROCEDURE — 3074F SYST BP LT 130 MM HG: CPT | Mod: CPTII,,, | Performed by: PSYCHIATRY & NEUROLOGY

## 2022-11-16 PROCEDURE — 99999 PR PBB SHADOW E&M-EST. PATIENT-LVL IV: CPT | Mod: PBBFAC,,, | Performed by: PSYCHIATRY & NEUROLOGY

## 2022-11-16 PROCEDURE — 3008F BODY MASS INDEX DOCD: CPT | Mod: CPTII,,, | Performed by: PSYCHIATRY & NEUROLOGY

## 2022-11-16 PROCEDURE — 3074F PR MOST RECENT SYSTOLIC BLOOD PRESSURE < 130 MM HG: ICD-10-PCS | Mod: CPTII,,, | Performed by: PSYCHIATRY & NEUROLOGY

## 2022-11-16 PROCEDURE — 36415 COLL VENOUS BLD VENIPUNCTURE: CPT | Performed by: PSYCHIATRY & NEUROLOGY

## 2022-11-16 PROCEDURE — 99999 PR PBB SHADOW E&M-EST. PATIENT-LVL IV: ICD-10-PCS | Mod: PBBFAC,,, | Performed by: PSYCHIATRY & NEUROLOGY

## 2022-11-16 RX ORDER — CLONAZEPAM 0.5 MG/1
TABLET ORAL
COMMUNITY
Start: 2022-08-26

## 2022-11-16 RX ORDER — LAMOTRIGINE 200 MG/1
200 TABLET ORAL 2 TIMES DAILY
COMMUNITY
Start: 2022-10-31

## 2022-11-16 RX ORDER — ETONOGESTREL AND ETHINYL ESTRADIOL VAGINAL RING .015; .12 MG/D; MG/D
RING VAGINAL
COMMUNITY
Start: 2022-10-17

## 2022-11-16 RX ORDER — ASENAPINE MALEATE 2.5 MG/1
2.5 TABLET SUBLINGUAL
COMMUNITY
Start: 2022-11-14

## 2022-11-16 RX ORDER — FOLIC ACID 1 MG/1
1 TABLET ORAL DAILY
Qty: 90 TABLET | Refills: 3 | Status: SHIPPED | OUTPATIENT
Start: 2022-11-16 | End: 2023-11-16

## 2022-11-16 NOTE — PATIENT INSTRUCTIONS
You came to [facility] because of a single convulsion in the setting of antibiotics (flagyl) and several different medications for mood. You have since stopped the antibiotic and decreased your wellbutrin from 450mg daily -> 300mg daily (150mg twice daily). You also started keppra 500mg twice daily. You have not had any additional episodes, but you are having some side effects to the Keppra. I think it is reasonable to stop Keppra now. I do not think this is the right medication for you. I would like you to get a lamotrigine level on your way out to see if we have some room to go up on the dose. Right now, with one convulsion event (the febrile seizure as a young child does not count) you do not meet criteria for epilepsy and you do not meet criteria for changes in antiseizure medications. However, I very much agree with lowering medications that lower the seizure threshold like Wellbutrin. I think Wellbutrin 150mg twice daily is a very reasonable dose to continue for now. Try to make sure your use of alcohol and benzodiazepines remains consistent.  I would like you to get an outpatient EEG to look at the electrical firing of your brain. Please call [phone] to schedule this.  Please sleep half your normal amount before the test so that you will fall asleep during the procedure which will provide us more information about how your brain works. I would also like to get an MRI of your brain to make sure there is nothing structural going on to cause the seizure. Take folic acid 1mg daily. Get regular sleep. Go to sleep at the same time and wake up at the same time every day.    Per [place] law, no episodes of loss of consciousness for 6 months before driving.  Avoid dangerous situations.  For example, no baths/pools alone, no heights, no power tools.  Wear a bike helmet.  If breakthrough seizures occur that are different in character, frequency, or duration from normal episodes, please patient portal me or  call the office and we will decide the next steps. If multiple seizures occur in a row without return back to baseline, 911 needs to be called.     Return to clinic in 6 months or sooner with issues.  Please patient portal with any questions or concerns.    Eileen Kuhn MD PhD  Neurology-Epilepsy  [facility].

## 2022-11-16 NOTE — PROGRESS NOTES
"Name: Carmella Gallagher  MRN:[id number]   CSN: [id number]  Date of service: 2022  Age:27 y.o.   Gender:female   Referring Physician/Service: Jane Adams MD  [facility] [place] [id number]   The patient is here today with:  self     Neurology Clinic: Initial Visit    CHIEF COMPLAINT:     HPI 2022:  Generalized convulsion    Age of first seizure: 4yo (one febrile seizure), 28yo    Handedness: right   Seizure Risk Factors: no family history of seizures, no head strike with LOC, no CNS infections, term  with no prolonged hospitalization   Time of Last Seizure:  2022, before that 4yo  # of lifetime Seizures: 2 seizures  by decades   Frequency of Seizures: most recent event, one seizure on one day   Seizure Triggers:  Bupropion 450 mg daily -> 300mg the week (for the last 7 years), etoh, metronidazole    Injuries/Hospitalization for seizures? No injury, EMS to ED but not admitted   Driving? No   Pregnancy? No, interested in this one day   Contraception? Nuva-ring, sexually active with a man currently   Folic Acid? No   Bone Health: No dexa   Mood: "I'm okay" no SI no HI      Auras: out of the blue     Seizure Events:   1. Staring into space, unresponsive, stiffening, turned blue, generalized shaking, foaming at the mouth, irregular breathing, 1-2 minutes, very tired afterwards, no tongue bite, no incontinence     Current AED/SEs:  1. Levetiracetam 500 mg twice daily SE cognitive slowing and fatigue  2. Bupropion 450 mg daily -> currently 150 twice daily  3. Clonazepam 0.5mg 1-2 times a week   4. Lamotrigine 200mg twice daily SE no issues     Previous AED/SEs or reason for DC.   Oxcarbazepine -> interfered with birth control     EEG: never   CT brain: arachnoid cyst   MRI brain: none     Other Allergies: none      AED compliance, adherence: pill minder     ROS 2022: Denies headache, loss of vision, blurred vision, diplopia, dysarthria, dysphagia, lightheadedness, vertigo, " "tinnitus or hearing difficulty. Denies difficulties producing or comprehending speech.  Denies focal weakness, numbness, paresthesias. Denies difficulty with gait. Denies cough, shortness of breath.  Denies chest pain or tightness, palpitations.  Denies nausea, vomiting, diarrhea, constipation or abdominal pain.  No falls.       EXAM:   - Vitals: /83   Pulse 101   Ht 5' 7" (1.702 m)   Wt 73.9 kg (163 lb)   BMI 25.53 kg/m²    - General: Awake, cooperative, NAD.  - HEENT: NC/AT  - Neck: Supple  - Pulmonary: no increased WOB  - Cardiac: well perfused   - Abdomen: soft, nontender, nondistended  - Extremities: no edema  - Skin: no rashes or lesions noted.     NEURO EXAM:   - Mental Status: Awake, alert, oriented x 3. Able to relate history without difficulty. Attentive to examiner. Language is fluent with intact repetition and comprehension. Normal prosody. There were no paraphasic errors. Able to name both high and low frequency objects. Speech was not dysarthric. Able to follow both midline and appendicular commands. There was no evidence of apraxia or neglect.    - Cranial Nerves:  VFF to confrontation. EOMI. No facial droop. Hearing intact to finger-rub bilaterally. 5/5 strength in trapezii and SCM bilaterally. Tongue protrudes in midline and to either side with no evidence of atrophy or weakness.    - Motor: Normal bulk and tone throughout. No pronator drift bilaterally. No adventitious movements such as tremor or asterixis noted.     Delt Bic Tri WrE WrF  FFl FE IO IP Quad Ham TA Gastroc    R   5     5    5    5    5        5   5    5   5    5        5     5      5            L   5      5    5   5    5        5    5   5    5    5       5     5      5              - Sensory: No deficits to light touch. No extinction to DSS.  - Coordination: No dysmetria on FNF   - Gait: Good initiation. Narrow-based, normal stride and arm swing. Romberg negative.    PLAN:  27-year-old woman with a distant history of a " single febrile seizure at 2yo comes in with a single generalized convulsion in the setting of bupropion 450 daily, asenapine 2.5 mg daily, metronidazole, and some alcohol/clonazepam.  Agree with down titration of bupropion to 150 twice daily.  She is currently off the proconvulsant antibiotic.  Levetiracetam is not the right medication for her, recommend to stop this.  Lamotrigine level today to guide escalation if additional episodes occur.  Folic acid.  EEG.  MRI.  No driving for 6 months. Follow up in about 6 months (around 5/16/2023).     Patient Instructions   You came to Epilepsy Clinic because of a single convulsion in the setting of antibiotics (flagyl) and several different medications for mood. You have since stopped the antibiotic and decreased your wellbutrin from 450mg daily -> 300mg daily (150mg twice daily). You also started keppra 500mg twice daily. You have not had any additional episodes, but you are having some side effects to the Keppra. I think it is reasonable to stop Keppra now. I do not think this is the right medication for you. I would like you to get a lamotrigine level on your way out to see if we have some room to go up on the dose. Right now, with one convulsion event (the febrile seizure as a young child does not count) you do not meet criteria for epilepsy and you do not meet criteria for changes in antiseizure medications. However, I very much agree with lowering medications that lower the seizure threshold like Wellbutrin. I think Wellbutrin 150mg twice daily is a very reasonable dose to continue for now. Try to make sure your use of alcohol and benzodiazepines remains consistent.  I would like you to get an outpatient EEG to look at the electrical firing of your brain. Please call [phone] to schedule this.  Please sleep half your normal amount before the test so that you will fall asleep during the procedure which will provide us more information about how your brain works. I  would also like to get an MRI of your brain to make sure there is nothing structural going on to cause the seizure. Take folic acid 1mg daily. Get regular sleep. Go to sleep at the same time and wake up at the same time every day.    Per [place] law, no episodes of loss of consciousness for 6 months before driving.  Avoid dangerous situations.  For example, no baths/pools alone, no heights, no power tools.  Wear a bike helmet.  If breakthrough seizures occur that are different in character, frequency, or duration from normal episodes, please patient portal me or call the office and we will decide the next steps. If multiple seizures occur in a row without return back to baseline, 911 needs to be called.     Return to clinic in 6 months or sooner with issues.  Please patient portal with any questions or concerns.    Eileen Kuhn MD PhD  Neurology-Epilepsy  [facility].      Problem List Items Addressed This Visit       Anxiety    Bipolar disorder    PTSD (post-traumatic stress disorder)    Convulsions - Primary    Relevant Medications    folic acid (FOLVITE) 1 MG tablet    Other Relevant Orders    EEG,w/awake & asleep record    MRI Brain Without Contrast    Lamotrigine Level       More than 50% of the 44 minutes spent with the patient (as well as family/caregiver(s) was spent on face-to-face counseling. Total time spent on encounter:  62 minutes    Disclaimer: This note has been generated using voice-recognition software. There may be typographical errors that were missed during proof-reading.     LABS:  Recent Labs   Lab 11/11/22  1350   WBC 10.03   Hemoglobin 13.8   Hematocrit 39.3   Platelets 282   Sodium 138   Potassium 3.9   BUN 13   Creatinine 1.0              IMAGING:  Recent imaging is personally reviewed with the patient.    Results for orders placed during the hospital encounter of 11/11/22    CT Head Without Contrast    Impression  No intracranial mass, hemorrhage, or evidence of  acute infarction.    Prominent CSF space in the posterior fossa, which could represent an arachnoid cyst or ariadna cisterna magna.      Electronically signed by: Diogenes Adams  Date:    11/11/2022  Time:    16:04    PAST MEDICAL HISTORY:   Active Ambulatory Problems     Diagnosis Date Noted    Anxiety     Bipolar disorder     PTSD (post-traumatic stress disorder)     Convulsions 11/16/2022     Resolved Ambulatory Problems     Diagnosis Date Noted    No Resolved Ambulatory Problems     No Additional Past Medical History        PAST SURGICAL HISTORY: History reviewed. No pertinent surgical history.     ALLERGIES: Patient has no known allergies.   CURRENT MEDICATIONS:   Current Outpatient Medications   Medication Sig Dispense Refill    asenapine maleate 2.5 mg Subl Place 2.5 mg under the tongue.      buPROPion (WELLBUTRIN XL) 300 MG 24 hr tablet Take 300 mg by mouth once daily.      busPIRone (BUSPAR) 15 MG tablet Take 15 mg by mouth 3 (three) times daily as needed.      clonazePAM (KLONOPIN) 0.5 MG tablet as needed.      etonogestreL-ethinyl estradioL (NUVARING) 0.12-0.015 mg/24 hr vaginal ring Place vaginally.      folic acid (FOLVITE) 1 MG tablet Take 1 tablet (1 mg total) by mouth once daily. 90 tablet 3    lamoTRIgine (LAMICTAL) 200 MG tablet Take 200 mg by mouth 2 (two) times daily.       No current facility-administered medications for this visit.        FAMILY HISTORY:   Family History   Problem Relation Age of Onset    No Known Problems Mother     No Known Problems Father          SOCIAL HISTORY:   Social History     Socioeconomic History    Marital status:    Tobacco Use    Smoking status: Never    Smokeless tobacco: Never   Substance and Sexual Activity    Alcohol use: Yes     Comment: occ    Drug use: No         Questions and concerns raised by the patient and family/care-giver(s) were addressed and they indicated understanding of everything discussed and agreed to plans as above.    Eileen Kuhn MD  PhD  Neurology-Epilepsy  [facility].

## 2022-11-19 LAB — LAMOTRIGINE SERPL-MCNC: 3.6 UG/ML (ref 2–15)

## 2022-12-08 ENCOUNTER — HOSPITAL ENCOUNTER (OUTPATIENT)
Dept: NEUROLOGY | Facility: CLINIC | Age: 27
Discharge: HOME | End: 2022-12-08
Payer: MEDICAID

## 2022-12-08 DIAGNOSIS — R56.9 CONVULSIONS, UNSPECIFIED CONVULSION TYPE: ICD-10-CM

## 2022-12-08 PROCEDURE — 95816 EEG AWAKE AND DROWSY: CPT | Mod: PBBFAC | Performed by: PSYCHIATRY & NEUROLOGY

## 2022-12-08 PROCEDURE — 95816 PR EEG,W/AWAKE & DROWSY RECORD: ICD-10-PCS | Mod: 26,S$PBB,, | Performed by: PSYCHIATRY & NEUROLOGY

## 2022-12-08 PROCEDURE — 95816 EEG AWAKE AND DROWSY: CPT | Mod: 26,S$PBB,, | Performed by: PSYCHIATRY & NEUROLOGY

## 2022-12-14 NOTE — PROCEDURES
EEG,w/awake & asleep record    Date/Time: 12/8/2022 10:49 AM  Performed by: Isaiah Spaulding MD  Authorized by: Eileen Kuhn MD PhD          This EEG was performed to assess for evidence of underlying epilepsy.     ELECTROENCEPHALOGRAM REPORT     METHODOLOGY:  Electroencephalographic (EEG) recording is with electrodes placed according to the International 10-20 placement system.  Thirty two (32) channels of digital signal are simultaneously recorded from the scalp and may include EKG, EMG, and/or eye monitors.   Recording band pass was 0.1 to 512 hz.  Digital video recording of the patient is simultaneously recorded with the EEG.  The staff report clinical symptoms and may press an event button when the patient has symptoms of clinical interest to the treating physicians.  EEG and video recording is stored and archived in digital format.  The entire recording is visually reviewed, and the times identified by computer analysis as being spikes or seizures are reviewed again.  Activation procedures which include photic stimulation, hyperventilation and instructing patients to perform simple task are done in selected patients.   Compresses spectral analysis (CSA) is also performed on the activity recorded from each individual channel.  This is displayed as a power display of frequencies from 0 to 30 Hz over time.   The CSA analysis is done and displayed continuously.  This is reviewed for asymmetries in power between homologous areas of the scalp and for presence of changes in power which can be seen when seizures occur.  Sections of suspected abnormalities on the CSA is then compared with the original EEG recording.                [employer] software was also utilized in the review of this study.  This software suite analyzes the EEG recording in multiple domains.  Coherence and rhythmicity is computed to identify EEG sections which may contain organized seizures.  Each channel undergoes analysis to detect presence of  spike and sharp waves which have special and morphological characteristic of epileptic activity.  The routine EEG recording is converted from spacial into frequency domain.  This is then displayed comparing homologous areas to identify areas of significant asymmetry.  Algorithm to identify non-cortically generated artifact is used to separate eye movement, EMG and other artifact from the EEG.     EEG FINDINGS:  The recording was obtained with a number of standard bipolar and referential montages during wakefulness and drowsiness.  In the alert state, the posterior background rhythm was a symmetric, well-modulated 9 to 10 Hz alpha rhythm, which reacted symmetrically to eye opening.  Intermittent photic stimulation evoked symmetric posterior driving responses. Hyperventilation produced physiological slowing.  No abnormalities were activated by photic stimulation or hyperventilation.  During drowsiness, the background rhythm waxed and waned and there were periods of slowing. There were no focal abnormalities.  There were no interictal epileptiform abnormalities and no clinical or electrographic seizures were recorded.    The EKG channel revealed a sinus rhythm.     IMPRESSION:  This is a normal EEG during wakefulness and drowsiness.      CLINICAL CORRELATION:  The patient is a  27-year-old female who is being evaluated for h/o seizures.  The patient is currently not maintained on any anti-seizure medications.  This is a normal EEG during wakefulness and drowsiness.  There is no evidence for neither cortical dysfunction nor an epileptic process on this recording.  No seizures were recorded during this study.

## 2023-01-05 ENCOUNTER — PATIENT MESSAGE (OUTPATIENT)
Dept: NEUROLOGY | Facility: CLINIC | Age: 28
End: 2023-01-05
Payer: MEDICAID

## 2023-02-08 ENCOUNTER — PATIENT MESSAGE (OUTPATIENT)
Dept: NEUROLOGY | Facility: CLINIC | Age: 28
End: 2023-02-08
Payer: MEDICAID

## 2023-02-08 ENCOUNTER — HOSPITAL ENCOUNTER (OUTPATIENT)
Dept: RADIOLOGY | Facility: HOSPITAL | Age: 28
Discharge: HOME | End: 2023-02-08
Attending: PSYCHIATRY & NEUROLOGY
Payer: MEDICAID

## 2023-02-08 DIAGNOSIS — R56.9 CONVULSIONS, UNSPECIFIED CONVULSION TYPE: ICD-10-CM

## 2023-02-08 PROCEDURE — 70551 MRI BRAIN STEM W/O DYE: CPT | Mod: TC

## 2023-02-08 PROCEDURE — 70551 MRI BRAIN WITHOUT CONTRAST: ICD-10-PCS | Mod: 26,,, | Performed by: RADIOLOGY

## 2023-02-08 PROCEDURE — 70551 MRI BRAIN STEM W/O DYE: CPT | Mod: 26,,, | Performed by: RADIOLOGY

## 2024-02-19 ENCOUNTER — OFFICE VISIT (OUTPATIENT)
Dept: PRIMARY CARE CLINIC | Facility: CLINIC | Age: 29
End: 2024-02-19
Payer: MEDICAID

## 2024-02-19 DIAGNOSIS — B35.6 TINEA CRURIS: Primary | ICD-10-CM

## 2024-02-19 PROCEDURE — 99213 OFFICE O/P EST LOW 20 MIN: CPT | Mod: S$PBB,,, | Performed by: NURSE PRACTITIONER

## 2024-02-19 PROCEDURE — 99212 OFFICE O/P EST SF 10 MIN: CPT | Mod: PBBFAC,PN | Performed by: NURSE PRACTITIONER

## 2024-02-19 PROCEDURE — 99999 PR PBB SHADOW E&M-EST. PATIENT-LVL II: CPT | Mod: PBBFAC,,, | Performed by: NURSE PRACTITIONER

## 2024-02-19 RX ORDER — ECONAZOLE NITRATE 10 MG/G
CREAM TOPICAL 2 TIMES DAILY
Qty: 30 G | Refills: 1 | Status: SHIPPED | OUTPATIENT
Start: 2024-02-19 | End: 2024-02-28

## 2024-02-19 NOTE — PROGRESS NOTES
Subjective:       Patient ID: Carmella Gallagher is a 28 y.o. female.    Chief Complaint: rash     Ms. Carmella Gallagher is a 28 year old female, new to me, presents to the clinic with rash. No PCP. Medical and surgical history in addition to problem list reviewed as listed below.     Rash  This is a new problem. The current episode started 1 to 4 weeks ago. The problem has been gradually worsening since onset. The affected locations include the groin (inner thighs, bilateral). The rash is characterized by itchiness, dryness, peeling, redness and scaling. She was exposed to nothing. Pertinent negatives include no fatigue, fever, shortness of breath, sore throat or vomiting. Treatments tried: lotrimin. The treatment provided no relief.       Past Medical History:   Diagnosis Date    Anxiety     Bipolar disorder     PTSD (post-traumatic stress disorder)         History reviewed. No pertinent surgical history.     Family History   Problem Relation Age of Onset    No Known Problems Mother     No Known Problems Father        Social History     Tobacco Use   Smoking Status Never   Smokeless Tobacco Never       Social History     Social History Narrative    Not on file       Review of patient's allergies indicates:  No Known Allergies     Review of Systems   Constitutional:  Negative for fatigue and fever.   HENT:  Negative for sore throat.    Respiratory:  Negative for chest tightness, shortness of breath and wheezing.    Cardiovascular:  Negative for chest pain and palpitations.   Gastrointestinal:  Negative for nausea and vomiting.   Skin:  Positive for rash.   Neurological:  Negative for headaches.         Objective:      There were no vitals filed for this visit.     Limited physical examination due to nature of virtual/telemedicine visit.    Physical Exam  Constitutional:       Appearance: Normal appearance.   Pulmonary:      Effort: Pulmonary effort is normal. No respiratory distress.   Musculoskeletal:         General:  Normal range of motion.      Cervical back: Normal range of motion.   Skin:     Capillary Refill: Capillary refill takes less than 2 seconds.             Comments: Areas of rash to left and right inner thigh, right and let groin area   Neurological:      Mental Status: She is alert and oriented to person, place, and time.         Assessment:       1. Tinea cruris        Plan:       Tinea cruris  -     econazole nitrate 1 % cream; Apply topically 2 (two) times daily. Apply to affected area. Do not apply to face and armpits. External use only. for 14 days  Dispense: 30 g; Refill: 1       Medication List with Changes/Refills   New Medications    ECONAZOLE NITRATE 1 % CREAM    Apply topically 2 (two) times daily. Apply to affected area. Do not apply to face and armpits. External use only. for 14 days   Current Medications    ASENAPINE MALEATE 2.5 MG SUBL    Place 2.5 mg under the tongue.    BUPROPION (WELLBUTRIN XL) 300 MG 24 HR TABLET    Take 300 mg by mouth once daily.    BUSPIRONE (BUSPAR) 15 MG TABLET    Take 15 mg by mouth 3 (three) times daily as needed.    CLONAZEPAM (KLONOPIN) 0.5 MG TABLET    as needed.    ETONOGESTREL-ETHINYL ESTRADIOL (NUVARING) 0.12-0.015 MG/24 HR VAGINAL RING    Place vaginally.    FOLIC ACID (FOLVITE) 1 MG TABLET    Take 1 tablet (1 mg total) by mouth once daily.    LAMOTRIGINE (LAMICTAL) 200 MG TABLET    Take 200 mg by mouth 2 (two) times daily.        Follow up if symptoms worsen or fail to improve.    Toma Jones, APRN, MSN, FNP-C

## 2024-02-27 NOTE — PROGRESS NOTES
Subjective:       Patient ID: Carmella Gallagher is a 28 y.o. female.    Chief Complaint: Follow-up and Rash      Rash  This is a recurrent problem. The current episode started 1 to 4 weeks ago. The problem has been gradually worsening since onset. The affected locations include the neck, chest, torso, abdomen and groin. The rash is characterized by burning and itchiness. She was exposed to nothing. Pertinent negatives include no anorexia, congestion, cough, diarrhea, eye pain, facial edema, fatigue, fever, joint pain, nail changes, rhinorrhea, shortness of breath, sore throat or vomiting. The treatment provided no relief. There is no history of allergies, asthma, eczema or varicella.       Past Medical History:   Diagnosis Date    Anxiety     Bipolar disorder     PTSD (post-traumatic stress disorder)         History reviewed. No pertinent surgical history.     Family History   Problem Relation Name Age of Onset    No Known Problems Mother      No Known Problems Father         Social History     Tobacco Use   Smoking Status Never   Smokeless Tobacco Never       Social History     Social History Narrative    Not on file       Review of patient's allergies indicates:   Allergen Reactions    Miconazole nitrate Hives, Itching and Rash        Review of Systems   Constitutional:  Negative for fatigue and fever.   HENT:  Negative for congestion, rhinorrhea and sore throat.    Eyes:  Negative for pain.   Respiratory:  Negative for cough and shortness of breath.    Gastrointestinal:  Negative for anorexia, diarrhea and vomiting.   Musculoskeletal:  Negative for joint pain.   Skin:  Positive for rash. Negative for nail changes.         Objective:        Vitals:    02/28/24 1058   BP: (!) 107/97   Pulse: 107   Temp: 97.1 °F (36.2 °C)        Physical Exam  Constitutional:       Appearance: Normal appearance.   Pulmonary:      Effort: Pulmonary effort is normal. No respiratory distress.   Skin:     General: Skin is warm and dry.    Neurological:      Mental Status: She is alert and oriented to person, place, and time.         Assessment:       1. Dermatitis    2. Itching        Plan:       Dermatitis  -     triamcinolone acetonide 0.1% (KENALOG) 0.1 % cream; Apply topically 2 (two) times daily. for 14 days  Dispense: 28.4 g; Refill: 1  -     doxycycline (VIBRA-TABS) 100 MG tablet; Take 1 tablet (100 mg total) by mouth 2 (two) times daily. for 10 days  Dispense: 20 tablet; Refill: 0    Itching  -     hydrOXYzine HCL (ATARAX) 25 MG tablet; Take 1 tablet (25 mg total) by mouth 3 (three) times daily as needed for Itching.  Dispense: 30 tablet; Refill: 0    Other orders  -     predniSONE (DELTASONE) 20 MG tablet; Take 1 tablet (20 mg total) by mouth once daily. Take in the morning, and do not take on an empty stomach. for 3 days  Dispense: 3 tablet; Refill: 0       Medication List with Changes/Refills   New Medications    HYDROXYZINE HCL (ATARAX) 25 MG TABLET    Take 1 tablet (25 mg total) by mouth 3 (three) times daily as needed for Itching.    TRIAMCINOLONE ACETONIDE 0.1% (KENALOG) 0.1 % CREAM    Apply topically 2 (two) times daily. for 14 days   Current Medications    ASENAPINE MALEATE 2.5 MG SUBL    Place 2.5 mg under the tongue.    BUPROPION (WELLBUTRIN XL) 300 MG 24 HR TABLET    Take 300 mg by mouth once daily.    BUSPIRONE (BUSPAR) 15 MG TABLET    Take 15 mg by mouth 3 (three) times daily as needed.    CLONAZEPAM (KLONOPIN) 0.5 MG TABLET    as needed.    ETONOGESTREL-ETHINYL ESTRADIOL (NUVARING) 0.12-0.015 MG/24 HR VAGINAL RING    Place vaginally.    FOLIC ACID (FOLVITE) 1 MG TABLET    Take 1 tablet (1 mg total) by mouth once daily.    LAMOTRIGINE (LAMICTAL) 200 MG TABLET    Take 200 mg by mouth 2 (two) times daily.    VALACYCLOVIR (VALTREX) 1000 MG TABLET    Take 1,000 mg by mouth.   Discontinued Medications    ECONAZOLE NITRATE 1 % CREAM    Apply topically 2 (two) times daily. Apply to affected area. Do not apply to face and armpits.  External use only. for 14 days            Follow up in about 2 weeks (around 3/13/2024), or if symptoms worsen or fail to improve, for Toma Jones, MSN, APRN, FNP-C.    I spent a total of 40 minutes on the day of the visit.This includes face to face time and non-face to face time preparing to see the patient (eg, review of tests), obtaining and/or reviewing separately obtained history, documenting clinical information in the electronic or other health record, independently interpreting results and communicating results to the patient/family/caregiver, or care coordinator.     TATA Whitten, MSN, FNP-C

## 2024-02-28 ENCOUNTER — OFFICE VISIT (OUTPATIENT)
Dept: PRIMARY CARE CLINIC | Facility: CLINIC | Age: 29
End: 2024-02-28
Payer: MEDICAID

## 2024-02-28 VITALS
TEMPERATURE: 97 F | HEART RATE: 107 BPM | WEIGHT: 183.63 LBS | BODY MASS INDEX: 28.82 KG/M2 | SYSTOLIC BLOOD PRESSURE: 107 MMHG | OXYGEN SATURATION: 97 % | HEIGHT: 67 IN | DIASTOLIC BLOOD PRESSURE: 97 MMHG

## 2024-02-28 DIAGNOSIS — L29.9 ITCHING: ICD-10-CM

## 2024-02-28 DIAGNOSIS — L30.9 DERMATITIS: Primary | ICD-10-CM

## 2024-02-28 PROCEDURE — 99214 OFFICE O/P EST MOD 30 MIN: CPT | Mod: PBBFAC,PN | Performed by: NURSE PRACTITIONER

## 2024-02-28 PROCEDURE — 3074F SYST BP LT 130 MM HG: CPT | Mod: CPTII,,, | Performed by: NURSE PRACTITIONER

## 2024-02-28 PROCEDURE — 99999 PR PBB SHADOW E&M-EST. PATIENT-LVL IV: CPT | Mod: PBBFAC,,, | Performed by: NURSE PRACTITIONER

## 2024-02-28 PROCEDURE — 3080F DIAST BP >= 90 MM HG: CPT | Mod: CPTII,,, | Performed by: NURSE PRACTITIONER

## 2024-02-28 PROCEDURE — 99215 OFFICE O/P EST HI 40 MIN: CPT | Mod: S$PBB,,, | Performed by: NURSE PRACTITIONER

## 2024-02-28 PROCEDURE — 3008F BODY MASS INDEX DOCD: CPT | Mod: CPTII,,, | Performed by: NURSE PRACTITIONER

## 2024-02-28 RX ORDER — PREDNISONE 20 MG/1
20 TABLET ORAL DAILY
Qty: 3 TABLET | Refills: 0 | Status: SHIPPED | OUTPATIENT
Start: 2024-02-28 | End: 2024-03-02

## 2024-02-28 RX ORDER — VALACYCLOVIR HYDROCHLORIDE 1 G/1
1000 TABLET, FILM COATED ORAL
COMMUNITY

## 2024-02-28 RX ORDER — HYDROXYZINE HYDROCHLORIDE 25 MG/1
25 TABLET, FILM COATED ORAL 3 TIMES DAILY PRN
Qty: 30 TABLET | Refills: 0 | Status: SHIPPED | OUTPATIENT
Start: 2024-02-28

## 2024-02-28 RX ORDER — TRIAMCINOLONE ACETONIDE 1 MG/G
CREAM TOPICAL 2 TIMES DAILY
Qty: 28.4 G | Refills: 1 | Status: SHIPPED | OUTPATIENT
Start: 2024-02-28 | End: 2024-03-13

## 2024-02-28 RX ORDER — DOXYCYCLINE HYCLATE 100 MG
100 TABLET ORAL 2 TIMES DAILY
Qty: 20 TABLET | Refills: 0 | Status: SHIPPED | OUTPATIENT
Start: 2024-02-28 | End: 2024-03-09

## 2024-02-28 NOTE — PATIENT INSTRUCTIONS
Do not take Buspar while taking Hydroxine during antibiotic treatment.    Take Prednisone in the morning, and do not take on an empty stomach.     Triamcinolone cream twice a day until return to clinic

## 2024-03-13 ENCOUNTER — OFFICE VISIT (OUTPATIENT)
Dept: PRIMARY CARE CLINIC | Facility: CLINIC | Age: 29
End: 2024-03-13
Payer: MEDICAID

## 2024-03-13 VITALS
BODY MASS INDEX: 28.73 KG/M2 | OXYGEN SATURATION: 98 % | HEART RATE: 105 BPM | HEIGHT: 67 IN | TEMPERATURE: 98 F | RESPIRATION RATE: 18 BRPM | WEIGHT: 183.06 LBS | SYSTOLIC BLOOD PRESSURE: 111 MMHG | DIASTOLIC BLOOD PRESSURE: 78 MMHG

## 2024-03-13 DIAGNOSIS — Z00.00 PHYSICAL EXAM: ICD-10-CM

## 2024-03-13 DIAGNOSIS — L30.9 DERMATITIS: Primary | ICD-10-CM

## 2024-03-13 DIAGNOSIS — E66.3 OVERWEIGHT WITH BODY MASS INDEX (BMI) OF 28 TO 28.9 IN ADULT: ICD-10-CM

## 2024-03-13 PROCEDURE — 3008F BODY MASS INDEX DOCD: CPT | Mod: CPTII,,, | Performed by: NURSE PRACTITIONER

## 2024-03-13 PROCEDURE — 99999 PR PBB SHADOW E&M-EST. PATIENT-LVL IV: CPT | Mod: PBBFAC,,, | Performed by: NURSE PRACTITIONER

## 2024-03-13 PROCEDURE — 3078F DIAST BP <80 MM HG: CPT | Mod: CPTII,,, | Performed by: NURSE PRACTITIONER

## 2024-03-13 PROCEDURE — 1159F MED LIST DOCD IN RCRD: CPT | Mod: CPTII,,, | Performed by: NURSE PRACTITIONER

## 2024-03-13 PROCEDURE — 99214 OFFICE O/P EST MOD 30 MIN: CPT | Mod: PBBFAC,PN | Performed by: NURSE PRACTITIONER

## 2024-03-13 PROCEDURE — 3074F SYST BP LT 130 MM HG: CPT | Mod: CPTII,,, | Performed by: NURSE PRACTITIONER

## 2024-03-13 PROCEDURE — 99213 OFFICE O/P EST LOW 20 MIN: CPT | Mod: S$PBB,,, | Performed by: NURSE PRACTITIONER

## 2024-03-13 NOTE — PROGRESS NOTES
"Subjective:       Patient ID: Carmella Gallagher is a 28 y.o. female.    Chief Complaint: Follow-up    Ms. Carmella Gallagher is a 28 year old female, established with me, presents to the clinic for follow up with rash. No PCP. Medical and surgical history in addition to problem list reviewed as listed below.     Reports triamcinolone cleared up rash. Itching was alleviated with hydroxyzine administered at night.       Past Medical History:   Diagnosis Date    Anxiety     Bipolar disorder     PTSD (post-traumatic stress disorder)         History reviewed. No pertinent surgical history.     Family History   Problem Relation Age of Onset    No Known Problems Mother     No Known Problems Father        Social History     Tobacco Use   Smoking Status Never   Smokeless Tobacco Never       Social History     Social History Narrative    Not on file       Review of patient's allergies indicates:   Allergen Reactions    Miconazole nitrate Hives, Itching and Rash        Review of Systems   Respiratory:  Negative for chest tightness and shortness of breath.    Cardiovascular:  Negative for chest pain and palpitations.   Gastrointestinal:  Negative for nausea.   Skin: Negative.    Neurological:  Negative for dizziness, light-headedness and headaches.         Objective:      Vitals:    03/13/24 1050 03/13/24 1100   BP: 111/78    BP Location: Right arm    Patient Position: Sitting    BP Method: Small (Automatic)    Pulse: 105    Resp:  18   Temp: 98.5 °F (36.9 °C) 97.6 °F (36.4 °C)   TempSrc: Oral Temporal   SpO2: 98%    Weight: 83 kg (183 lb 1.5 oz)    Height: 5' 7" (1.702 m)       Physical Exam  Constitutional:       Appearance: Normal appearance.   HENT:      Right Ear: External ear normal.      Left Ear: External ear normal.      Nose: No congestion or rhinorrhea.   Eyes:      Conjunctiva/sclera: Conjunctivae normal.   Pulmonary:      Effort: Pulmonary effort is normal. No respiratory distress.   Abdominal:      General: Bowel " sounds are normal. There is no distension.      Tenderness: There is no abdominal tenderness. There is no guarding.   Musculoskeletal:         General: Normal range of motion.      Cervical back: Normal range of motion.   Skin:     General: Skin is warm and dry.      Capillary Refill: Capillary refill takes less than 2 seconds.   Neurological:      Mental Status: She is alert and oriented to person, place, and time.         Assessment:       1. Dermatitis    2. Overweight with body mass index (BMI) of 28 to 28.9 in adult        Plan:       Dermatitis  Resolved.    Overweight with body mass index (BMI) of 28 to 28.9 in adult  Recommend Dash/Mediterranean diet, exercise 3 times a week for 30 minute intervals, increase as tolerated.        Encouraged to schedule appointment for annual examination, Health maintenance reviewed, declined vaccinations and laboratory test.  Medication List with Changes/Refills   Current Medications    ASENAPINE MALEATE 2.5 MG SUBL    Place 2.5 mg under the tongue.    BUPROPION (WELLBUTRIN XL) 300 MG 24 HR TABLET    Take 300 mg by mouth once daily.    BUSPIRONE (BUSPAR) 15 MG TABLET    Take 15 mg by mouth 3 (three) times daily as needed.    CLONAZEPAM (KLONOPIN) 0.5 MG TABLET    as needed.    ETONOGESTREL-ETHINYL ESTRADIOL (NUVARING) 0.12-0.015 MG/24 HR VAGINAL RING    Place vaginally.    FOLIC ACID (FOLVITE) 1 MG TABLET    Take 1 tablet (1 mg total) by mouth once daily.    HYDROXYZINE HCL (ATARAX) 25 MG TABLET    Take 1 tablet (25 mg total) by mouth 3 (three) times daily as needed for Itching.    LAMOTRIGINE (LAMICTAL) 200 MG TABLET    Take 200 mg by mouth 2 (two) times daily.    TRIAMCINOLONE ACETONIDE 0.1% (KENALOG) 0.1 % CREAM    Apply topically 2 (two) times daily. for 14 days    VALACYCLOVIR (VALTREX) 1000 MG TABLET    Take 1,000 mg by mouth.        Follow up if symptoms worsen or fail to improve.     I spent a total of 20 minutes on the day of the visit.This includes face to face time  and non-face to face time preparing to see the patient (eg, review of tests), obtaining and/or reviewing separately obtained history, documenting clinical information in the electronic or other health record, independently interpreting results and communicating results to the patient/family/caregiver, or care coordinator.     Toma Jones APRN, MSN, FNP-C